# Patient Record
Sex: FEMALE | Race: WHITE | NOT HISPANIC OR LATINO | Employment: PART TIME | URBAN - METROPOLITAN AREA
[De-identification: names, ages, dates, MRNs, and addresses within clinical notes are randomized per-mention and may not be internally consistent; named-entity substitution may affect disease eponyms.]

---

## 2020-08-21 LAB — PAP RECOMMENDATION EXT: NORMAL

## 2022-11-07 LAB — BCS RECOMMENDATION EXT: NORMAL

## 2023-08-07 ENCOUNTER — TELEPHONE (OUTPATIENT)
Dept: FAMILY MEDICINE | Facility: CLINIC | Age: 68
End: 2023-08-07
Payer: MEDICARE

## 2023-08-07 NOTE — TELEPHONE ENCOUNTER
----- Message from Abhinav Berg sent at 8/7/2023  8:14 AM CDT -----  Contact: Pt  Type:  Sooner Apoointment Request    Caller is requesting a sooner appointment.  Caller declined first available appointment listed below.  Caller will not accept being placed on the waitlist and is requesting a message be sent to doctor.  Name of Caller: pt   When is the first available appointment? 09/27 -pt has an appt on that date  -   Symptoms: estab care/ pt not feeling well/ getting worse /pain at right hip/ tailbone   Would the patient rather a call back or a response via [facility]? phone  Best Call Back Number: [phone]  Additional Information:

## 2023-08-08 ENCOUNTER — OFFICE VISIT (OUTPATIENT)
Dept: FAMILY MEDICINE | Facility: CLINIC | Age: 68
End: 2023-08-08
Payer: MEDICARE

## 2023-08-08 VITALS
BODY MASS INDEX: 28.51 KG/M2 | OXYGEN SATURATION: 98 % | TEMPERATURE: 97 F | SYSTOLIC BLOOD PRESSURE: 150 MMHG | DIASTOLIC BLOOD PRESSURE: 100 MMHG | HEART RATE: 108 BPM | RESPIRATION RATE: 16 BRPM | WEIGHT: 167 LBS | HEIGHT: 64 IN

## 2023-08-08 DIAGNOSIS — M25.551 RIGHT HIP PAIN: ICD-10-CM

## 2023-08-08 DIAGNOSIS — R19.7 DIARRHEA, UNSPECIFIED TYPE: ICD-10-CM

## 2023-08-08 DIAGNOSIS — Z12.11 COLON CANCER SCREENING: ICD-10-CM

## 2023-08-08 DIAGNOSIS — I10 PRIMARY HYPERTENSION: ICD-10-CM

## 2023-08-08 DIAGNOSIS — Z76.89 ENCOUNTER TO ESTABLISH CARE: Primary | ICD-10-CM

## 2023-08-08 DIAGNOSIS — F41.9 ANXIETY: ICD-10-CM

## 2023-08-08 DIAGNOSIS — E78.5 HYPERLIPIDEMIA, UNSPECIFIED HYPERLIPIDEMIA TYPE: ICD-10-CM

## 2023-08-08 DIAGNOSIS — K21.9 GASTROESOPHAGEAL REFLUX DISEASE, UNSPECIFIED WHETHER ESOPHAGITIS PRESENT: ICD-10-CM

## 2023-08-08 DIAGNOSIS — I49.1 PAC (PREMATURE ATRIAL CONTRACTION): ICD-10-CM

## 2023-08-08 DIAGNOSIS — E03.9 HYPOTHYROIDISM, UNSPECIFIED TYPE: ICD-10-CM

## 2023-08-08 LAB
ALBUMIN SERPL BCP-MCNC: 3.8 G/DL (ref 3.4–5)
ALP SERPL-CCNC: 71 U/L (ref 45–117)
ALT SERPL W P-5'-P-CCNC: 21 U/L (ref 13–56)
ANION GAP SERPL CALC-SCNC: 6 MMOL/L (ref 3–11)
AST SERPL-CCNC: 17 U/L (ref 15–37)
BILIRUB SERPL-MCNC: 0.4 MG/DL (ref 0.2–1)
BUN SERPL-MCNC: 8 MG/DL (ref 7–18)
BUN/CREAT SERPL: 9.41 RATIO
CALCIUM SERPL-MCNC: 8.8 MG/DL (ref 8.5–10.1)
CHLORIDE SERPL-SCNC: 108 MMOL/L (ref 98–107)
CO2 SERPL-SCNC: 28 MMOL/L (ref 21–32)
CREAT SERPL-MCNC: 0.85 MG/DL (ref 0.55–1.02)
GFR ESTIMATION: > 60
GLUCOSE SERPL-MCNC: 106 MG/DL (ref 74–106)
POTASSIUM SERPL-SCNC: 4.4 MMOL/L (ref 3.5–5.1)
PROT SERPL-MCNC: 7.4 G/DL (ref 6.4–8.2)
SODIUM BLD-SCNC: 142 MMOL/L (ref 131–143)
T4 FREE SP9 P CHAL SERPL-SCNC: 1.23 NG/DL (ref 0.76–1.46)
TSH SERPL DL<=0.005 MIU/L-ACNC: 2.24 UIU/ML (ref 0.36–3.74)

## 2023-08-08 PROCEDURE — 3008F BODY MASS INDEX DOCD: CPT | Mod: CPTII,S$GLB,, | Performed by: INTERNAL MEDICINE

## 2023-08-08 PROCEDURE — 3008F PR BODY MASS INDEX (BMI) DOCUMENTED: ICD-10-PCS | Mod: CPTII,S$GLB,, | Performed by: INTERNAL MEDICINE

## 2023-08-08 PROCEDURE — 99205 OFFICE O/P NEW HI 60 MIN: CPT | Mod: S$GLB,,, | Performed by: INTERNAL MEDICINE

## 2023-08-08 PROCEDURE — 3080F DIAST BP >= 90 MM HG: CPT | Mod: CPTII,S$GLB,, | Performed by: INTERNAL MEDICINE

## 2023-08-08 PROCEDURE — 1159F PR MEDICATION LIST DOCUMENTED IN MEDICAL RECORD: ICD-10-PCS | Mod: CPTII,S$GLB,, | Performed by: INTERNAL MEDICINE

## 2023-08-08 PROCEDURE — 1160F RVW MEDS BY RX/DR IN RCRD: CPT | Mod: CPTII,S$GLB,, | Performed by: INTERNAL MEDICINE

## 2023-08-08 PROCEDURE — 3077F PR MOST RECENT SYSTOLIC BLOOD PRESSURE >= 140 MM HG: ICD-10-PCS | Mod: CPTII,S$GLB,, | Performed by: INTERNAL MEDICINE

## 2023-08-08 PROCEDURE — 3080F PR MOST RECENT DIASTOLIC BLOOD PRESSURE >= 90 MM HG: ICD-10-PCS | Mod: CPTII,S$GLB,, | Performed by: INTERNAL MEDICINE

## 2023-08-08 PROCEDURE — 3077F SYST BP >= 140 MM HG: CPT | Mod: CPTII,S$GLB,, | Performed by: INTERNAL MEDICINE

## 2023-08-08 PROCEDURE — 1159F MED LIST DOCD IN RCRD: CPT | Mod: CPTII,S$GLB,, | Performed by: INTERNAL MEDICINE

## 2023-08-08 PROCEDURE — 99205 PR OFFICE/OUTPT VISIT, NEW, LEVL V, 60-74 MIN: ICD-10-PCS | Mod: S$GLB,,, | Performed by: INTERNAL MEDICINE

## 2023-08-08 PROCEDURE — 1160F PR REVIEW ALL MEDS BY PRESCRIBER/CLIN PHARMACIST DOCUMENTED: ICD-10-PCS | Mod: CPTII,S$GLB,, | Performed by: INTERNAL MEDICINE

## 2023-08-08 RX ORDER — BUSPIRONE HYDROCHLORIDE 10 MG/1
10 TABLET ORAL 2 TIMES DAILY
Qty: 60 TABLET | Refills: 3 | Status: SHIPPED | OUTPATIENT
Start: 2023-08-08 | End: 2023-10-06

## 2023-08-08 RX ORDER — METOPROLOL SUCCINATE 25 MG/1
25 TABLET, EXTENDED RELEASE ORAL NIGHTLY
Qty: 90 TABLET | Refills: 1 | Status: SHIPPED | OUTPATIENT
Start: 2023-08-08 | End: 2023-11-08

## 2023-08-08 RX ORDER — HYDROXYZINE HYDROCHLORIDE 25 MG/1
TABLET, FILM COATED ORAL
Qty: 90 TABLET | Refills: 1 | Status: SHIPPED | OUTPATIENT
Start: 2023-08-08 | End: 2023-10-06

## 2023-08-08 RX ORDER — LEVOTHYROXINE SODIUM 88 UG/1
88 TABLET ORAL
COMMUNITY

## 2023-08-08 RX ORDER — ROSUVASTATIN CALCIUM 10 MG/1
10 TABLET, COATED ORAL
COMMUNITY
Start: 2023-04-15 | End: 2023-08-08 | Stop reason: SDUPTHER

## 2023-08-08 RX ORDER — METOPROLOL SUCCINATE 25 MG/1
25 TABLET, EXTENDED RELEASE ORAL NIGHTLY
COMMUNITY
Start: 2023-05-18 | End: 2023-08-08 | Stop reason: SDUPTHER

## 2023-08-08 RX ORDER — ROSUVASTATIN CALCIUM 10 MG/1
10 TABLET, COATED ORAL DAILY
Qty: 90 TABLET | Refills: 1 | Status: SHIPPED | OUTPATIENT
Start: 2023-08-08 | End: 2023-11-08

## 2023-08-08 RX ORDER — HYDROXYZINE HYDROCHLORIDE 25 MG/1
TABLET, FILM COATED ORAL
COMMUNITY
Start: 2023-05-19 | End: 2023-08-08 | Stop reason: SDUPTHER

## 2023-08-08 NOTE — PROGRESS NOTES
Subjective:       Patient ID: Kristin Plascencia is a 68 y.o. female.    Chief Complaint: Establish Care (Patient states that she needs a referral. Her right hip has been hurting.)    HPI    68 y.o. female here to establish care. Patient was previously seen by Dr. Phil Curran for primary care. She bring in copy of recent clinic visit with previus pcp as well as labs from May 2023.        Health Maintenance Topics with due status: Not Due       Topic Last Completion Date    Influenza Vaccine 11/19/2022       Health Maintenance Due   Topic Date Due    Hepatitis C Screening  Never done    Lipid Panel  Never done    COVID-19 Vaccine (1) Never done    TETANUS VACCINE  Never done    Mammogram  Never done    Hemoglobin A1c (Diabetic Prevention Screening)  Never done    DEXA Scan  Never done    Colorectal Cancer Screening  Never done    Shingles Vaccine (1 of 2) Never done    Pneumococcal Vaccines (Age 65+) (1 - PCV) Never done         Medical Problems:      Patient Active Problem List   Diagnosis    Hyperlipidemia    Hypothyroidism    Right hip pain    Gastroesophageal reflux disease    Diarrhea    PAC (premature atrial contraction)    Anxiety    Primary hypertension       Current Outpatient Medications on File Prior to Visit   Medication Sig Dispense Refill    levothyroxine (SYNTHROID) 88 MCG tablet Take 88 mcg by mouth before breakfast.      [DISCONTINUED] hydrOXYzine HCL (ATARAX) 25 MG tablet TAKE 1 TABLET BY MOUTH THREE TIMES DAILY AS NEEDED FOR ACUTE ANXIETY      [DISCONTINUED] metoprolol succinate (TOPROL-XL) 25 MG 24 hr tablet Take 25 mg by mouth every evening.      [DISCONTINUED] rosuvastatin (CRESTOR) 10 MG tablet Take 10 mg by mouth.       No current facility-administered medications on file prior to visit.           Past Medical History:   Diagnosis Date    Agatston coronary artery calcium score less than 100     Anxiety      Past Surgical History:   Procedure Laterality Date    GALLBLADDER SURGERY  2016     SINUS SURGERY       Family History   Problem Relation Age of Onset    Alzheimer's disease Mother     Cancer Father     Asthma Sister      Social History     Socioeconomic History    Marital status:    Tobacco Use    Smoking status: Never     Passive exposure: Never    Smokeless tobacco: Never   Substance and Sexual Activity    Alcohol use: Yes     Comment: socially    Drug use: Never     Review of patient's allergies indicates:  No Known Allergies    Current Outpatient Medications:     levothyroxine (SYNTHROID) 88 MCG tablet, Take 88 mcg by mouth before breakfast., Disp: , Rfl:     busPIRone (BUSPAR) 10 MG tablet, Take 1 tablet (10 mg total) by mouth 2 (two) times daily., Disp: 60 tablet, Rfl: 3    hydrOXYzine HCL (ATARAX) 25 MG tablet, TAKE 1 TABLET BY MOUTH THREE TIMES DAILY AS NEEDED FOR ACUTE ANXIETY, Disp: 90 tablet, Rfl: 1    metoprolol succinate (TOPROL-XL) 25 MG 24 hr tablet, Take 1 tablet (25 mg total) by mouth every evening., Disp: 90 tablet, Rfl: 1    rosuvastatin (CRESTOR) 10 MG tablet, Take 1 tablet (10 mg total) by mouth once daily., Disp: 90 tablet, Rfl: 1        Review of Systems   Constitutional:  Negative for diaphoresis and fever.   Respiratory:  Negative for cough and shortness of breath.    Cardiovascular:  Negative for chest pain and leg swelling.   Gastrointestinal:  Positive for abdominal pain (cramping/ bloating) and diarrhea. Negative for blood in stool.   Genitourinary:  Negative for difficulty urinating and dysuria.   Musculoskeletal:  Positive for arthralgias. Negative for gait problem.   Skin:  Negative for pallor.   Neurological:  Negative for syncope and weakness.   Psychiatric/Behavioral:  The patient is nervous/anxious.        Objective:        Vitals:    08/08/23 0809 08/08/23 0905   BP: (!) 181/114 (!) 150/100   BP Location: Left arm Right arm   Patient Position: Sitting Sitting   BP Method: Large (Manual) Large (Manual)   Pulse: 108    Resp: 16    Temp: 97.2 °F (36.2 °C)   "  SpO2: 98%    Weight: 75.8 kg (167 lb)    Height: 5' 4" (1.626 m)        Body mass index is 28.67 kg/m².    Physical Exam  Constitutional:       General: She is not in acute distress.     Appearance: She is well-developed. She is not diaphoretic.   HENT:      Head: Normocephalic and atraumatic.      Right Ear: External ear normal.      Left Ear: External ear normal.   Cardiovascular:      Rate and Rhythm: Normal rate and regular rhythm.   Pulmonary:      Effort: Pulmonary effort is normal.      Breath sounds: Normal breath sounds.   Abdominal:      General: There is no distension.      Palpations: Abdomen is soft.   Musculoskeletal:      Cervical back: Neck supple.      Right hip: No deformity or lacerations.      Right upper leg: No deformity.      Right lower leg: No edema.      Left lower leg: No edema.   Skin:     General: Skin is warm and dry.      Nails: There is no clubbing.   Neurological:      General: No focal deficit present.      Mental Status: She is alert.      Gait: Gait normal.   Psychiatric:         Mood and Affect: Mood is anxious.         Behavior: Behavior normal.         Thought Content: Thought content normal.         Judgment: Judgment normal.         Assessment:     1. Encounter to establish care    2. Colon cancer screening    3. Hyperlipidemia, unspecified hyperlipidemia type    4. Hypothyroidism, unspecified type    5. Right hip pain    6. Gastroesophageal reflux disease, unspecified whether esophagitis present    7. Diarrhea, unspecified type    8. PAC (premature atrial contraction)    9. Anxiety    10. Primary hypertension           Plan:         1. Encounter to establish care  - reviewed healthcare maintenance and pt's chronic medical problems.   - labs - reviewed outside labs, ordered f/u for today  - immunizations reviewed  - CRC screen - due  - pap/gyn - utd annually w/ Dr. Snowden  - mammo - utd annually w/ Dr. Snowden  - Comprehensive Metabolic Panel; Future  - Comprehensive " Metabolic Panel    2. Colon cancer screening    - Ambulatory referral/consult to Gastroenterology; Future    3. Hyperlipidemia, unspecified hyperlipidemia type    - rosuvastatin (CRESTOR) 10 MG tablet; Take 1 tablet (10 mg total) by mouth once daily.  Dispense: 90 tablet; Refill: 1  - Lipids Digital Medicine (LDMP) Enrollment Order  - Lipids Digital Medicine (LDMP): Assign Onboarding Questionnaires    4. Hypothyroidism, unspecified type    - levothyroxine (SYNTHROID) 88 MCG tablet; Take 88 mcg by mouth before breakfast.  - TSH w/reflex to FT4; Future  - TSH w/reflex to FT4    5. Right hip pain  - further management pending results. Discussed possible interventional pain ref vs ortho.   Pt politely declines PT referral d/t limited time availability w/ work schedule.   - X-Ray Hip 2 or 3 views Right (with Pelvis when performed); Future    6. Gastroesophageal reflux disease, unspecified whether esophagitis present    - Ambulatory referral/consult to Gastroenterology; Future    7. Diarrhea, unspecified type    - Ambulatory referral/consult to Gastroenterology; Future    8. PAC (premature atrial contraction)  - per medical records, pt unaware of dx  - metoprolol succinate (TOPROL-XL) 25 MG 24 hr tablet; Take 1 tablet (25 mg total) by mouth every evening.  Dispense: 90 tablet; Refill: 1    9. Anxiety  - not optimally controlled w/ prn hydroxyzine. Discussed options, elected for trial of buspar  - hydrOXYzine HCL (ATARAX) 25 MG tablet; TAKE 1 TABLET BY MOUTH THREE TIMES DAILY AS NEEDED FOR ACUTE ANXIETY  Dispense: 90 tablet; Refill: 1  - busPIRone (BUSPAR) 10 MG tablet; Take 1 tablet (10 mg total) by mouth 2 (two) times daily.  Dispense: 60 tablet; Refill: 3    10. Primary hypertension  - pt reports good bp at home. Cont current meds. Ctm home BP - RTC 2-4 weeks w/ BP log and home monitor  - metoprolol succinate (TOPROL-XL) 25 MG 24 hr tablet; Take 1 tablet (25 mg total) by mouth every evening.  Dispense: 90 tablet;  Refill: 1  - Hypertension Digital Medicine (HDMP) Enrollment Order  - Hypertension Digital Medicine (HDMP): Assign Onboarding Questionnaires                Unless there are intervening problems, Follow up in about 4 weeks (around 9/5/2023), or if symptoms worsen or fail to improve, for follow up, HTN, anxiety.      Patient note was created using MModal Dictation.  Any errors in syntax or even information may not have been identified and edited on initial review prior to signing this note.    I spent a total of 60 minutes on the day of the visit.        This includes face to face time and non-face to face time preparing to see the patient (eg, review of tests), obtaining and/or reviewing separately obtained history, documenting clinical information in the electronic or other health record, independently interpreting results and communicating results to the patient/family/caregiver, or care coordinator.

## 2023-08-08 NOTE — PROGRESS NOTES
Results released to patient portal.      Your results have been reviewed.  Electrolytes, liver and kidney function within normal range.   Thyroid hormone labs normal on current dose ov levothyroxine.   Please call if you have any questions or concerns.    Sincerely,   Dr. Moore

## 2023-08-22 ENCOUNTER — TELEPHONE (OUTPATIENT)
Dept: FAMILY MEDICINE | Facility: CLINIC | Age: 68
End: 2023-08-22
Payer: MEDICARE

## 2023-08-22 NOTE — TELEPHONE ENCOUNTER
Called pt. To check on her setting up HTN [facility].  Pt. States she does not want to do this at this time and will discuss it further at next appt with Dr. Lauren Moore

## 2023-09-05 ENCOUNTER — OFFICE VISIT (OUTPATIENT)
Dept: FAMILY MEDICINE | Facility: CLINIC | Age: 68
End: 2023-09-05
Payer: MEDICARE

## 2023-09-05 VITALS
DIASTOLIC BLOOD PRESSURE: 78 MMHG | BODY MASS INDEX: 29.84 KG/M2 | HEIGHT: 64 IN | SYSTOLIC BLOOD PRESSURE: 123 MMHG | OXYGEN SATURATION: 99 % | HEART RATE: 90 BPM | TEMPERATURE: 97 F | RESPIRATION RATE: 15 BRPM | WEIGHT: 174.81 LBS

## 2023-09-05 DIAGNOSIS — F41.9 ANXIETY: ICD-10-CM

## 2023-09-05 DIAGNOSIS — I49.1 PAC (PREMATURE ATRIAL CONTRACTION): ICD-10-CM

## 2023-09-05 DIAGNOSIS — I10 PRIMARY HYPERTENSION: Primary | ICD-10-CM

## 2023-09-05 DIAGNOSIS — E03.9 HYPOTHYROIDISM, UNSPECIFIED TYPE: ICD-10-CM

## 2023-09-05 PROCEDURE — 1126F PR PAIN SEVERITY QUANTIFIED, NO PAIN PRESENT: ICD-10-PCS | Mod: CPTII,S$GLB,, | Performed by: INTERNAL MEDICINE

## 2023-09-05 PROCEDURE — 1160F PR REVIEW ALL MEDS BY PRESCRIBER/CLIN PHARMACIST DOCUMENTED: ICD-10-PCS | Mod: CPTII,S$GLB,, | Performed by: INTERNAL MEDICINE

## 2023-09-05 PROCEDURE — 3074F PR MOST RECENT SYSTOLIC BLOOD PRESSURE < 130 MM HG: ICD-10-PCS | Mod: CPTII,S$GLB,, | Performed by: INTERNAL MEDICINE

## 2023-09-05 PROCEDURE — 3288F PR FALLS RISK ASSESSMENT DOCUMENTED: ICD-10-PCS | Mod: CPTII,S$GLB,, | Performed by: INTERNAL MEDICINE

## 2023-09-05 PROCEDURE — 99214 PR OFFICE/OUTPT VISIT, EST, LEVL IV, 30-39 MIN: ICD-10-PCS | Mod: S$GLB,,, | Performed by: INTERNAL MEDICINE

## 2023-09-05 PROCEDURE — 1126F AMNT PAIN NOTED NONE PRSNT: CPT | Mod: CPTII,S$GLB,, | Performed by: INTERNAL MEDICINE

## 2023-09-05 PROCEDURE — 3078F DIAST BP <80 MM HG: CPT | Mod: CPTII,S$GLB,, | Performed by: INTERNAL MEDICINE

## 2023-09-05 PROCEDURE — 99214 OFFICE O/P EST MOD 30 MIN: CPT | Mod: S$GLB,,, | Performed by: INTERNAL MEDICINE

## 2023-09-05 PROCEDURE — 3008F PR BODY MASS INDEX (BMI) DOCUMENTED: ICD-10-PCS | Mod: CPTII,S$GLB,, | Performed by: INTERNAL MEDICINE

## 2023-09-05 PROCEDURE — 3008F BODY MASS INDEX DOCD: CPT | Mod: CPTII,S$GLB,, | Performed by: INTERNAL MEDICINE

## 2023-09-05 PROCEDURE — 3074F SYST BP LT 130 MM HG: CPT | Mod: CPTII,S$GLB,, | Performed by: INTERNAL MEDICINE

## 2023-09-05 PROCEDURE — 1101F PT FALLS ASSESS-DOCD LE1/YR: CPT | Mod: CPTII,S$GLB,, | Performed by: INTERNAL MEDICINE

## 2023-09-05 PROCEDURE — 3288F FALL RISK ASSESSMENT DOCD: CPT | Mod: CPTII,S$GLB,, | Performed by: INTERNAL MEDICINE

## 2023-09-05 PROCEDURE — 1160F RVW MEDS BY RX/DR IN RCRD: CPT | Mod: CPTII,S$GLB,, | Performed by: INTERNAL MEDICINE

## 2023-09-05 PROCEDURE — 1159F PR MEDICATION LIST DOCUMENTED IN MEDICAL RECORD: ICD-10-PCS | Mod: CPTII,S$GLB,, | Performed by: INTERNAL MEDICINE

## 2023-09-05 PROCEDURE — 3078F PR MOST RECENT DIASTOLIC BLOOD PRESSURE < 80 MM HG: ICD-10-PCS | Mod: CPTII,S$GLB,, | Performed by: INTERNAL MEDICINE

## 2023-09-05 PROCEDURE — 1101F PR PT FALLS ASSESS DOC 0-1 FALLS W/OUT INJ PAST YR: ICD-10-PCS | Mod: CPTII,S$GLB,, | Performed by: INTERNAL MEDICINE

## 2023-09-05 PROCEDURE — 1159F MED LIST DOCD IN RCRD: CPT | Mod: CPTII,S$GLB,, | Performed by: INTERNAL MEDICINE

## 2023-09-05 NOTE — PROGRESS NOTES
"Subjective:       Patient ID: Kristin Plascencia is a 68 y.o. female.    Chief Complaint: Follow-up (Pt. Here for 4wk f/u for high BP and med. F/u.  Pt. Brought home BP log and monitor.  Pt. States Buspar caused heart racing, sweating and HA's.  Pt. D/c'd Buspar 2 days ago.)    HPI    68 y.o. female presents for follow up of chronic medical problems:     HTN: well controlled BP at home. Home monitor c/w clinic manual reading. Notes white coat HTN.     PAC: metoprolol 25mg daily    Anxiety: hydroxyzine 25mg prn. Buspar 10mg caused SE so discontinued.    HLD: rosuvastatin 10mg    Hypothyroidism: levothyroxine 88mcg    Has gyn apt scheduled 9/15/23 with Dr. Snowden for pap and mammo orders.  Pt brought copy of labs last clinic visit, however not uploaded to media section at this time. She will bring copy when has GI apt on 10/6    Review of Systems   Constitutional:  Negative for fever.   Respiratory:  Negative for shortness of breath.    Cardiovascular:  Negative for chest pain.   Genitourinary:  Negative for difficulty urinating.   Neurological:  Negative for syncope.   Psychiatric/Behavioral:  The patient is nervous/anxious.        Objective:        Vitals:    09/05/23 1031 09/05/23 1057   BP: (!) 200/100  Comment: Pt. Monitor:  194/101 123/78   BP Location: Left arm    Patient Position: Sitting    BP Method: Large (Manual)    Pulse: 90    Resp: 15    Temp: 97.3 °F (36.3 °C)    TempSrc: Temporal    SpO2: 99%    Weight: 79.3 kg (174 lb 12.8 oz)    Height: 5' 4" (1.626 m)        Body mass index is 30 kg/m².    Physical Exam  Constitutional:       General: She is not in acute distress.     Appearance: She is well-developed.   HENT:      Head: Normocephalic and atraumatic.      Right Ear: External ear normal.      Left Ear: External ear normal.   Cardiovascular:      Rate and Rhythm: Normal rate.   Pulmonary:      Effort: Pulmonary effort is normal.   Skin:     General: Skin is warm and dry.   Neurological:      General: No " focal deficit present.      Mental Status: She is alert. Mental status is at baseline.   Psychiatric:         Mood and Affect: Mood is anxious.         Assessment:     1. Primary hypertension    2. PAC (premature atrial contraction)    3. Hypothyroidism, unspecified type           Plan:         1. Primary hypertension  - well controlled at home    2. PAC (premature atrial contraction)  - cont metoprolol 25mg daily    3. Hypothyroidism, unspecified type  - cont synthroid 88mcg. Refill request prn.    4. Anxiety  - intolerant to hydroxyzine, buspar. Previously intolerant to SSRI, SNRI- trial many in past. Pt wishes for short acting prn med as generally acute panic attacks however is reluctant for benzodiazepine which I agree with. Discussed CBT w/ therapist which she will look into as she is about to have lifestyle change since her daughter and three grandsons are moving in with her later this week.      Bring copy of recent labs at pt's convenience.   Pap and mammo through gyn- upcoming apt.  Has GI apt scheduled to discuss c-scope.       Unless there are intervening problems, Follow up in about 6 months (around 3/5/2024), or if symptoms worsen or fail to improve, for annual, follow up.      Patient note was created using MModal Dictation.  Any errors in syntax or even information may not have been identified and edited on initial review prior to signing this note.    I spent a total of 34 minutes on the day of the visit.  This includes face to face time and non-face to face time preparing to see the patient (eg, review of tests), obtaining and/or reviewing separately obtained history, documenting clinical information in the electronic or other health record, independently interpreting results and communicating results to the patient/family/caregiver, or care coordinator.

## 2023-09-08 ENCOUNTER — PATIENT OUTREACH (OUTPATIENT)
Dept: ADMINISTRATIVE | Facility: HOSPITAL | Age: 68
End: 2023-09-08
Payer: MEDICARE

## 2023-09-08 NOTE — LETTER
AUTHORIZATION FOR RELEASE OF   CONFIDENTIAL INFORMATION        We are seeing Kristin Plascencia, date of birth 1955, in the clinic at [facility]. Lauren Moore MD is the patient's PCP. Kristin Plascencia has an outstanding lab/procedure at the time we reviewed her chart. In order to help keep her health information updated, she has authorized us to request the following medical record(s):        (  )  MAMMOGRAM                                      (  )  COLONOSCOPY      (  )  PAP SMEAR                                          (X)  LAB RESULTS      (X)  DEXA SCAN                                          (  )  EYE EXAM            (  )  FOOT EXAM                                          (  )  ENTIRE RECORD     (  )  OUTSIDE IMMUNIZATIONS                 (  )  _______________    **SIGNED ANNA ATTACHED    Please fax records to [facility], Paulk, Emily M, MD, [phone]    [facility]  Care Coordination Department  [facility]    Patient Name: Kristin Plascencia  : 1955  Patient Phone #: [phone]

## 2023-09-08 NOTE — LETTER
AUTHORIZATION FOR RELEASE OF   CONFIDENTIAL INFORMATION        We are seeing Kristin Plascencia, date of birth 1955, in the clinic at [facility]. Lauren Moore MD is the patient's PCP. Kristin Plascencia has an outstanding lab/procedure at the time we reviewed her chart. In order to help keep her health information updated, she has authorized us to request the following medical record(s):        (  )  MAMMOGRAM                                      (  )  COLONOSCOPY      (  )  PAP SMEAR                                          (  )  LAB RESULTS      (  )  DEXA SCAN                                          (X)  DM EYE EXAM            (  )  FOOT EXAM                                          (  )  ENTIRE RECORD     (  )  OUTSIDE IMMUNIZATIONS                 (  )  _______________    **SIGNED ANNA ATTACHED    Please fax records to [facility], Paulk, Emily M, MD, [phone]    [facility]  Care Coordination Department  [facility]    Patient Name: Kristin Plascencia  : 1955  Patient Phone #: [phone]

## 2023-09-08 NOTE — LETTER
AUTHORIZATION FOR RELEASE OF   CONFIDENTIAL INFORMATION        We are seeing Kristin Plascencia, date of birth 1955, in the clinic at [facility]. Lauren Moore MD is the patient's PCP. Kristin Plascencia has an outstanding lab/procedure at the time we reviewed her chart. In order to help keep her health information updated, she has authorized us to request the following medical record(s):        (X)  MAMMOGRAM                                      (  )  COLONOSCOPY      (X)  PAP SMEAR                                          (  )  LAB RESULTS      (X)  DEXA SCAN                                          (  )  EYE EXAM            (  )  FOOT EXAM                                          (  )  ENTIRE RECORD     (  )  OUTSIDE IMMUNIZATIONS                 (  )  _______________    **SIGNED ANNA ATTACHED    Please fax records to [facility], Paulk, Emily M, MD, [phone]    [facility]  Care Coordination Department  [facility]    Patient Name: Kristin Plascencia  : 1955  Patient Phone #: [phone]

## 2023-09-08 NOTE — PROGRESS NOTES
Health Maintenance ANNA 09-:  ANNA faxed to Dr. Curran for Hemoglobin A1c, Lipid Panel, MicoAlbumin Urine  ANNA faxed to [facility] for DM EYE EXAM  ANNA faxed to Dr. Snowden for Pap Smear, Mammogram-Scheduled for 09/15/2023    Scheduled for Colonoscopy screening with gastro department 10/06/2023  2022 Flu Vaccine satisfied to

## 2023-09-12 NOTE — PROGRESS NOTES
Uploaded 2023 EYE EXAM to media.    Per Dr. Curran office, No labs collect or record of labs for patient.  Hemoglobin A1c, Lipid Panel, Microalbumin Urine, or Dexa Scan    Per office visit with PCP on 09/05, patient brought copy last lab to visit but records were not scan into media at that time.

## 2023-10-06 ENCOUNTER — TELEPHONE (OUTPATIENT)
Dept: FAMILY MEDICINE | Facility: CLINIC | Age: 68
End: 2023-10-06
Payer: MEDICARE

## 2023-10-06 ENCOUNTER — OFFICE VISIT (OUTPATIENT)
Dept: GASTROENTEROLOGY | Facility: CLINIC | Age: 68
End: 2023-10-06
Payer: MEDICARE

## 2023-10-06 VITALS
HEART RATE: 90 BPM | OXYGEN SATURATION: 98 % | DIASTOLIC BLOOD PRESSURE: 90 MMHG | HEIGHT: 64 IN | BODY MASS INDEX: 29.19 KG/M2 | WEIGHT: 171 LBS | SYSTOLIC BLOOD PRESSURE: 175 MMHG

## 2023-10-06 DIAGNOSIS — R13.10 DYSPHAGIA, UNSPECIFIED TYPE: ICD-10-CM

## 2023-10-06 DIAGNOSIS — Z12.11 COLON CANCER SCREENING: ICD-10-CM

## 2023-10-06 DIAGNOSIS — E03.9 HYPOTHYROIDISM, UNSPECIFIED TYPE: ICD-10-CM

## 2023-10-06 DIAGNOSIS — R19.7 DIARRHEA, UNSPECIFIED TYPE: Primary | ICD-10-CM

## 2023-10-06 DIAGNOSIS — K21.9 GASTROESOPHAGEAL REFLUX DISEASE, UNSPECIFIED WHETHER ESOPHAGITIS PRESENT: ICD-10-CM

## 2023-10-06 PROCEDURE — 3077F SYST BP >= 140 MM HG: CPT | Mod: CPTII,S$GLB,,

## 2023-10-06 PROCEDURE — 3008F PR BODY MASS INDEX (BMI) DOCUMENTED: ICD-10-PCS | Mod: CPTII,S$GLB,,

## 2023-10-06 PROCEDURE — 3080F PR MOST RECENT DIASTOLIC BLOOD PRESSURE >= 90 MM HG: ICD-10-PCS | Mod: CPTII,S$GLB,,

## 2023-10-06 PROCEDURE — 1160F RVW MEDS BY RX/DR IN RCRD: CPT | Mod: CPTII,S$GLB,,

## 2023-10-06 PROCEDURE — 3080F DIAST BP >= 90 MM HG: CPT | Mod: CPTII,S$GLB,,

## 2023-10-06 PROCEDURE — 3077F PR MOST RECENT SYSTOLIC BLOOD PRESSURE >= 140 MM HG: ICD-10-PCS | Mod: CPTII,S$GLB,,

## 2023-10-06 PROCEDURE — 1160F PR REVIEW ALL MEDS BY PRESCRIBER/CLIN PHARMACIST DOCUMENTED: ICD-10-PCS | Mod: CPTII,S$GLB,,

## 2023-10-06 PROCEDURE — 1159F PR MEDICATION LIST DOCUMENTED IN MEDICAL RECORD: ICD-10-PCS | Mod: CPTII,S$GLB,,

## 2023-10-06 PROCEDURE — 1159F MED LIST DOCD IN RCRD: CPT | Mod: CPTII,S$GLB,,

## 2023-10-06 PROCEDURE — 4010F PR ACE/ARB THEARPY RXD/TAKEN: ICD-10-PCS | Mod: CPTII,S$GLB,,

## 2023-10-06 PROCEDURE — 3008F BODY MASS INDEX DOCD: CPT | Mod: CPTII,S$GLB,,

## 2023-10-06 PROCEDURE — 4010F ACE/ARB THERAPY RXD/TAKEN: CPT | Mod: CPTII,S$GLB,,

## 2023-10-06 PROCEDURE — 99204 PR OFFICE/OUTPT VISIT, NEW, LEVL IV, 45-59 MIN: ICD-10-PCS | Mod: S$GLB,,,

## 2023-10-06 PROCEDURE — 99204 OFFICE O/P NEW MOD 45 MIN: CPT | Mod: S$GLB,,,

## 2023-10-06 RX ORDER — SOD SULF/POT CHLORIDE/MAG SULF 1.479 G
TABLET ORAL
Qty: 24 TABLET | Refills: 0 | Status: SHIPPED | OUTPATIENT
Start: 2023-10-06 | End: 2024-03-12

## 2023-10-06 RX ORDER — MONTELUKAST SODIUM 4 MG/1
1 TABLET, CHEWABLE ORAL 2 TIMES DAILY
Qty: 60 TABLET | Refills: 3 | Status: SHIPPED | OUTPATIENT
Start: 2023-10-06 | End: 2024-03-12

## 2023-10-06 NOTE — TELEPHONE ENCOUNTER
Fax RF:  Synthroid [facility] mail order to     [facility]  Fax:  [phone]    *I do not see pharmacy in EMR*

## 2023-10-06 NOTE — PROGRESS NOTES
Clinic Note    Reason for visit:  The primary encounter diagnosis was Diarrhea, unspecified type. Diagnoses of Gastroesophageal reflux disease, unspecified whether esophagitis present, Dysphagia, unspecified type, and Colon cancer screening were also pertinent to this visit.    PCP: Lauren Moore [address] [place] [id number] / [place] [address]    HPI:  This is a 68 y.o. female who is here to establish care. Patient is overdue for repeat colonoscopy. She had colonoscopy at age 51. No polyps per patient. She has IBS-D and has had more diarrhea since having GB out years ago. No blood in stool. She has bouts of reflux and will have hoarseness and cough during that time. Occasionally has dysphagia with solids and liquids. Does not require medication for reflux. No prior EGD.       Review of Systems   Constitutional:  Negative for fatigue, fever and unexpected weight change.   HENT:  Positive for postnasal drip. Negative for mouth sores, sore throat and trouble swallowing.    Eyes:  Positive for eye dryness. Negative for pain and discharge.   Respiratory:  Negative for apnea, cough, choking, chest tightness, shortness of breath and wheezing.    Cardiovascular:  Negative for chest pain, palpitations and leg swelling.   Gastrointestinal:  Positive for diarrhea and reflux. Negative for abdominal distention, abdominal pain, anal bleeding, blood in stool, change in bowel habit, constipation, nausea, rectal pain, vomiting and fecal incontinence.   Genitourinary:  Negative for bladder incontinence, dysuria and hematuria.   Musculoskeletal:  Negative for arthralgias, back pain and joint swelling.   Integumentary:  Negative for color change and rash.   Allergic/Immunologic: Negative for environmental allergies and food allergies.   Neurological:  Negative for seizures and headaches.   Hematological:  Negative for adenopathy. Does not bruise/bleed easily.        Past Medical History:   Diagnosis Date    [facility]  "artery calcium score less than 100     Anxiety     BMI 29.0-29.9,adult     Disorder of thyroid, unspecified     Open angle primary glaucoma     s/p laser tx     Past Surgical History:   Procedure Laterality Date    GALLBLADDER SURGERY  2016    SINUS SURGERY       Family History   Problem Relation Age of Onset    Alzheimer's disease Mother     Cancer Father     Asthma Sister      Social History     Tobacco Use    Smoking status: Never     Passive exposure: Never    Smokeless tobacco: Never   Substance Use Topics    Alcohol use: Yes     Comment: socially    Drug use: Never     Review of patient's allergies indicates:  No Known Allergies   Medication List with Changes/Refills   New Medications    COLESTIPOL (COLESTID) 1 GRAM TAB    Take 1 tablet (1 g total) by mouth 2 (two) times daily. Take 2 hours before or 4 hours after other medications.    SOD SULF-POT CHLORIDE-MAG SULF (SUTAB) 1.479-0.188- 0.225 GRAM TABLET    Take according to package instructions with indicated amount of water. No breakfast day before test. May substitute with Suprep, Clenpiq, Plenvu, Moviprep or GoLytely based on Rx plan and patient preference.   Current Medications    LEVOTHYROXINE (SYNTHROID) 88 MCG TABLET    Take 88 mcg by mouth before breakfast.    METOPROLOL SUCCINATE (TOPROL-XL) 25 MG 24 HR TABLET    Take 1 tablet (25 mg total) by mouth every evening.    ROSUVASTATIN (CRESTOR) 10 MG TABLET    Take 1 tablet (10 mg total) by mouth once daily.   Discontinued Medications    BUSPIRONE (BUSPAR) 10 MG TABLET    Take 1 tablet (10 mg total) by mouth 2 (two) times daily.    HYDROXYZINE HCL (ATARAX) 25 MG TABLET    TAKE 1 TABLET BY MOUTH THREE TIMES DAILY AS NEEDED FOR ACUTE ANXIETY         Vital Signs:  BP (!) 175/90   Pulse 90   Ht 5' 4" (1.626 m)   Wt 77.6 kg (171 lb)   LMP  (LMP Unknown)   SpO2 98%   BMI 29.35 kg/m²        Physical Exam  Vitals reviewed.   Constitutional:       General: She is awake. She is not in acute distress.     " Appearance: Normal appearance. She is well-developed. She is not ill-appearing, toxic-appearing or diaphoretic.   HENT:      Head: Normocephalic and atraumatic.      Nose: Nose normal.      Mouth/Throat:      Mouth: Mucous membranes are moist.      Pharynx: Oropharynx is clear. No oropharyngeal exudate or posterior oropharyngeal erythema.   Eyes:      General: Lids are normal. Gaze aligned appropriately. No scleral icterus.        Right eye: No discharge.         Left eye: No discharge.      Conjunctiva/sclera: Conjunctivae normal.   Neck:      Trachea: Trachea normal.   Cardiovascular:      Rate and Rhythm: Normal rate and regular rhythm.      Pulses:           Radial pulses are 2+ on the right side and 2+ on the left side.   Pulmonary:      Effort: Pulmonary effort is normal. No respiratory distress.      Breath sounds: No stridor. No wheezing.   Chest:      Chest wall: No tenderness.   Abdominal:      General: Bowel sounds are normal. There is no distension.      Palpations: Abdomen is soft. There is no fluid wave, hepatomegaly or mass.      Tenderness: There is no abdominal tenderness. There is no guarding or rebound.   Musculoskeletal:         General: No tenderness or deformity.      Cervical back: Full passive range of motion without pain and neck supple. No tenderness.      Right lower leg: No edema.      Left lower leg: No edema.   Lymphadenopathy:      Cervical: No cervical adenopathy.   Skin:     General: Skin is warm and dry.      Capillary Refill: Capillary refill takes less than 2 seconds.      Coloration: Skin is not cyanotic, jaundiced or pale.   Neurological:      General: No focal deficit present.      Mental Status: She is alert and oriented to person, place, and time.      Motor: No tremor.   Psychiatric:         Attention and Perception: Attention normal.         Mood and Affect: Mood and affect normal.         Speech: Speech normal.         Behavior: Behavior normal. Behavior is cooperative.             All of the data above and below has been reviewed by myself and any further interpretations will be reflected in the assessment and plan.   The data includes review of external notes, and independent interpretation of lab results, procedures, x-rays, and imaging reports.      Assessment:  Diarrhea, unspecified type  -     Ambulatory referral/consult to Gastroenterology    Gastroesophageal reflux disease, unspecified whether esophagitis present  -     Ambulatory referral/consult to Gastroenterology  -     Ambulatory Referral to External Surgery    Dysphagia, unspecified type  -     Ambulatory Referral to External Surgery    Colon cancer screening  -     Ambulatory referral/consult to Gastroenterology  -     Ambulatory Referral to External Surgery  -     sod sulf-pot chloride-mag sulf (SUTAB) 1.479-0.188- 0.225 gram tablet; Take according to package instructions with indicated amount of water. No breakfast day before test. May substitute with Suprep, Clenpiq, Plenvu, Moviprep or GoLytely based on Rx plan and patient preference.  Dispense: 24 tablet; Refill: 0    Other orders  -     colestipoL (COLESTID) 1 gram Tab; Take 1 tablet (1 g total) by mouth 2 (two) times daily. Take 2 hours before or 4 hours after other medications.  Dispense: 60 tablet; Refill: 3    Likely BAD, trial of bile acid binder. Consider Xifaxan.   Due for colon  Plan for EBx. Consider EM if EGD unrevealing to rule out dysmotility.     Recommendations:  Schedule upper and lower endoscopies with Dr. Morales.   Begin taking colestipol 1 tablet twice a day.     Risks, benefits, and alternatives of medical management, any associated procedures, and/or treatment discussed with the patient. Patient given opportunity to ask questions and voices understanding. Patient has elected to proceed with the recommended care modalities as discussed.        Order summary:  Orders Placed This Encounter   Procedures    Ambulatory Referral to External Surgery         Instructed patient to notify my office if they have not been contacted within two weeks after any procedures, submitting any samples (biopsies, blood, stool, urine, etc.) or after any imaging (X-ray, CT, MRI, etc.).      Ayleen Diamond NP    This document may have been created using a voice recognition transcribing system. Incorrect words or phrases may have been missed during proofreading. Please interpret accordingly or contact me for clarification.

## 2023-10-06 NOTE — PATIENT INSTRUCTIONS
Schedule upper and lower endoscopies with Dr. Morales.   Begin taking colestipol 1 tablet twice a day.

## 2023-10-10 RX ORDER — LEVOTHYROXINE SODIUM 88 UG/1
88 TABLET ORAL
Qty: 90 TABLET | Refills: 1 | Status: SHIPPED | OUTPATIENT
Start: 2023-10-10 | End: 2024-03-12

## 2023-10-10 NOTE — TELEPHONE ENCOUNTER
"Pt. Advised and verbalized understanding.  Pt. States she has not been checking bp at home due to "being busy", but she will start checking it again and call us within the week with the readings.  "

## 2023-10-10 NOTE — TELEPHONE ENCOUNTER
Unable to find pharmacy/fax on to e-rx. Rx printed and signed.   Can we get updated BP from pt? Last on file is significantly elevated. If BP running high at home, please schedule clinic apt.

## 2023-10-24 ENCOUNTER — TELEPHONE (OUTPATIENT)
Dept: FAMILY MEDICINE | Facility: CLINIC | Age: 68
End: 2023-10-24
Payer: MEDICARE

## 2023-10-24 VITALS — SYSTOLIC BLOOD PRESSURE: 123 MMHG | DIASTOLIC BLOOD PRESSURE: 75 MMHG

## 2023-10-24 NOTE — TELEPHONE ENCOUNTER
Reviewed home BP log. BP well controlled. Continue current management, no changes.   Remote BP updated w/ most recent BP reading

## 2023-10-27 ENCOUNTER — TELEPHONE (OUTPATIENT)
Dept: FAMILY MEDICINE | Facility: CLINIC | Age: 68
End: 2023-10-27
Payer: MEDICARE

## 2023-10-27 VITALS — SYSTOLIC BLOOD PRESSURE: 129 MMHG | DIASTOLIC BLOOD PRESSURE: 86 MMHG

## 2023-10-27 NOTE — TELEPHONE ENCOUNTER
Pt. Has a log of BP readings that she brought to us beginning on 10/11/2023 with 129/86 6am    10/12/23:  146/80 6am  10/12/2023: 131/73  second reading (no time given)    10/13/23: 118/77 8:30am    10/14/23  147/83  9am    10/15/23:  112/56  10:40am    10/16/23  113/68  10am    10/17/23  133/75  6am  10/17/23  131/66  9:30pm    10/18/23  108/72  6am  10/18/23  123/75  9:30pm    Pt. Reports no concerns at this time.  No sx reported.

## 2023-11-08 DIAGNOSIS — I49.1 PAC (PREMATURE ATRIAL CONTRACTION): ICD-10-CM

## 2023-11-08 DIAGNOSIS — I10 PRIMARY HYPERTENSION: ICD-10-CM

## 2023-11-08 DIAGNOSIS — E78.5 HYPERLIPIDEMIA, UNSPECIFIED HYPERLIPIDEMIA TYPE: ICD-10-CM

## 2023-11-08 RX ORDER — METOPROLOL SUCCINATE 25 MG/1
25 TABLET, EXTENDED RELEASE ORAL DAILY
Qty: 90 TABLET | Refills: 1 | Status: SHIPPED | OUTPATIENT
Start: 2023-11-08 | End: 2024-03-12 | Stop reason: SDUPTHER

## 2023-11-08 RX ORDER — ROSUVASTATIN CALCIUM 10 MG/1
10 TABLET, COATED ORAL DAILY
Qty: 90 TABLET | Refills: 1 | Status: SHIPPED | OUTPATIENT
Start: 2023-11-08 | End: 2024-03-12 | Stop reason: SDUPTHER

## 2023-11-10 LAB — BCS RECOMMENDATION EXT: NORMAL

## 2023-11-29 ENCOUNTER — TELEPHONE (OUTPATIENT)
Dept: FAMILY MEDICINE | Facility: CLINIC | Age: 68
End: 2023-11-29
Payer: MEDICARE

## 2023-11-29 NOTE — TELEPHONE ENCOUNTER
Was put  On Amoxicillin originally the first time she went to Urgent Care, On cefdinir now.  Tessalon capsules are not helping cough  Pt. States cough is unbearable, off and on  X 6 weeks.  Pt. Has been to Urgent Care twice.  No fever.  Pt. Being tx for strep.    Neg. Covid, Neg Flu on 11/30.    Please advise.

## 2023-11-29 NOTE — TELEPHONE ENCOUNTER
----- Message from Evangelina Antunez sent at 11/29/2023 12:08 PM CST -----  States she would like to schedule an Urgent Care f/u for sinus, strep and coughing. Nothing coming up on the schedule. Please call pt [phone]. Thank you

## 2023-12-07 ENCOUNTER — PATIENT OUTREACH (OUTPATIENT)
Dept: ADMINISTRATIVE | Facility: HOSPITAL | Age: 68
End: 2023-12-07
Payer: MEDICARE

## 2023-12-07 NOTE — PROGRESS NOTES
Working [employer] HTN Report.    Chart reviewed. Phone note 10/27/23 included list of bp readings.   The last being 10/18/23  123/75  9:30pm   LM requesting an updated BP reading.

## 2023-12-18 ENCOUNTER — PATIENT OUTREACH (OUTPATIENT)
Dept: ADMINISTRATIVE | Facility: HOSPITAL | Age: 68
End: 2023-12-18
Payer: MEDICARE

## 2024-01-31 DIAGNOSIS — Z78.0 MENOPAUSE: ICD-10-CM

## 2024-02-09 ENCOUNTER — TELEPHONE (OUTPATIENT)
Dept: GASTROENTEROLOGY | Facility: CLINIC | Age: 69
End: 2024-02-09
Payer: MEDICARE

## 2024-02-09 VITALS — BODY MASS INDEX: 29.19 KG/M2 | HEIGHT: 64 IN | WEIGHT: 171 LBS

## 2024-02-09 DIAGNOSIS — R13.10 DYSPHAGIA, UNSPECIFIED TYPE: ICD-10-CM

## 2024-02-09 DIAGNOSIS — Z12.11 COLON CANCER SCREENING: ICD-10-CM

## 2024-02-09 DIAGNOSIS — K21.9 GASTROESOPHAGEAL REFLUX DISEASE, UNSPECIFIED WHETHER ESOPHAGITIS PRESENT: Primary | ICD-10-CM

## 2024-02-09 NOTE — TELEPHONE ENCOUNTER
"[facility]  [address] [place] [address]  Phone: [phone]  Fax: [phone]    History & Physical         Provider: Dr. Rima Morales    Patient Name: Kristin HATHAWAY (age):1955  69 y.o.           Gender: female   Phone: [phone]     Referring Physician: Lauren Moore     Vital Signs:   Height - 5' 4"  Weight - 171 lb  BMI -  29.35    Plan: EGD/Colonoscopy @ [facility]    Encounter Diagnoses   Name Primary?    Gastroesophageal reflux disease, unspecified whether esophagitis present Yes    Dysphagia, unspecified type     Colon cancer screening            History:      Past Medical History:   Diagnosis Date    [place] coronary artery calcium score less than 100     Anxiety     BMI 29.0-29.9,adult     Disorder of thyroid, unspecified     Open angle primary glaucoma     s/p laser tx      Past Surgical History:   Procedure Laterality Date    GALLBLADDER SURGERY      SINUS SURGERY        Medication List with Changes/Refills   Current Medications    COLESTIPOL (COLESTID) 1 GRAM TAB    Take 1 tablet (1 g total) by mouth 2 (two) times daily. Take 2 hours before or 4 hours after other medications.    LEVOTHYROXINE (SYNTHROID) 88 MCG TABLET    Take 88 mcg by mouth before breakfast.    METOPROLOL SUCCINATE (TOPROL-XL) 25 MG 24 HR TABLET    Take 1 tablet (25 mg total) by mouth once daily.    ROSUVASTATIN (CRESTOR) 10 MG TABLET    Take 1 tablet (10 mg total) by mouth once daily.    SOD SULF-POT CHLORIDE-MAG SULF (SUTAB) 1.479-0.188- 0.225 GRAM TABLET    Take according to package instructions with indicated amount of water. No breakfast day before test. May substitute with Suprep, Clenpiq, Plenvu, Moviprep or GoLytely based on Rx plan and patient preference.    SYNTHROID 88 MCG TABLET    Take 1 tablet (88 mcg total) by mouth before breakfast.      Review of patient's allergies indicates:  No Known " Allergies   Family History   Problem Relation Age of Onset    Alzheimer's disease Mother     Cancer Father     Asthma Sister       Social History     Tobacco Use    Smoking status: Never     Passive exposure: Never    Smokeless tobacco: Never   Substance Use Topics    Alcohol use: Yes     Comment: socially    Drug use: Never        Physical Examination:     General Appearance:___________________________  HEENT: _____________________________________  Abdomen:____________________________________  Heart:________________________________________  Lungs:_______________________________________  Extremities:___________________________________  Skin:_________________________________________  Endocrine:____________________________________  Genitourinary:_________________________________  Neurological:__________________________________      Patient has been evaluated immediately prior to sedation and is medically cleared for endoscopy with IVCS as an ASA class: ______      Physician Signature: _________________________       Date: ________  Time: ________

## 2024-02-09 NOTE — TELEPHONE ENCOUNTER
Called and left a detailed message that I was calling as a courtesy regarding her upcoming EGD/Colon with NBP on 2/19/24, Monday and wanted to verify that she had her paper prep instructions and meds. I also mentioned that [facility] will call on Friday 16th with the arrival time, GI Lab is located on the third floor, and to pre-register next week. ranjit

## 2024-02-19 ENCOUNTER — OUTSIDE PLACE OF SERVICE (OUTPATIENT)
Dept: GASTROENTEROLOGY | Facility: CLINIC | Age: 69
End: 2024-02-19

## 2024-02-19 LAB — CRC RECOMMENDATION EXT: NORMAL

## 2024-02-19 PROCEDURE — 45380 COLONOSCOPY AND BIOPSY: CPT | Mod: PT,59,, | Performed by: INTERNAL MEDICINE

## 2024-02-19 PROCEDURE — 45385 COLONOSCOPY W/LESION REMOVAL: CPT | Mod: PT,,, | Performed by: INTERNAL MEDICINE

## 2024-02-19 PROCEDURE — 43239 EGD BIOPSY SINGLE/MULTIPLE: CPT | Mod: 51,,, | Performed by: INTERNAL MEDICINE

## 2024-02-26 ENCOUNTER — TELEPHONE (OUTPATIENT)
Dept: GASTROENTEROLOGY | Facility: CLINIC | Age: 69
End: 2024-02-26
Payer: MEDICARE

## 2024-02-26 DIAGNOSIS — R13.10 DYSPHAGIA, UNSPECIFIED TYPE: Primary | ICD-10-CM

## 2024-02-27 NOTE — TELEPHONE ENCOUNTER
DBx nl, GBx wnl w/o Hp, EBx nl, CBx nl, 2 hyp, repeat colonoscopy in 5 years.     Notify patient. No signs of infection, precancerous cells or Celiac disease on the upper endoscopy biopsies.  Her colon polyps were benign. No colitis on CBx. Repeat colonoscopy in 5 years. Confirm recall tab in appointment desk is up-to-date (update if needed). Rec EM given swallowing Sx and unrevealing EGD.  Rec Xifaxan course of colestipol did not help loose stools.  NBP

## 2024-02-29 NOTE — TELEPHONE ENCOUNTER
Called and spoke with patient gave her results and she understood , also her 5 year is updated in the recall tab.  kdc

## 2024-03-05 ENCOUNTER — DOCUMENTATION ONLY (OUTPATIENT)
Dept: GASTROENTEROLOGY | Facility: CLINIC | Age: 69
End: 2024-03-05
Payer: MEDICARE

## 2024-03-06 DIAGNOSIS — Z78.0 MENOPAUSE: ICD-10-CM

## 2024-03-12 ENCOUNTER — TELEPHONE (OUTPATIENT)
Dept: FAMILY MEDICINE | Facility: CLINIC | Age: 69
End: 2024-03-12

## 2024-03-12 ENCOUNTER — OFFICE VISIT (OUTPATIENT)
Dept: FAMILY MEDICINE | Facility: CLINIC | Age: 69
End: 2024-03-12
Payer: MEDICARE

## 2024-03-12 VITALS
HEART RATE: 64 BPM | BODY MASS INDEX: 28.65 KG/M2 | WEIGHT: 167.81 LBS | TEMPERATURE: 97 F | HEIGHT: 64 IN | RESPIRATION RATE: 15 BRPM | SYSTOLIC BLOOD PRESSURE: 122 MMHG | DIASTOLIC BLOOD PRESSURE: 74 MMHG | OXYGEN SATURATION: 99 %

## 2024-03-12 DIAGNOSIS — E78.5 HYPERLIPIDEMIA, UNSPECIFIED HYPERLIPIDEMIA TYPE: ICD-10-CM

## 2024-03-12 DIAGNOSIS — I49.1 PAC (PREMATURE ATRIAL CONTRACTION): ICD-10-CM

## 2024-03-12 DIAGNOSIS — Z79.899 OTHER LONG TERM (CURRENT) DRUG THERAPY: ICD-10-CM

## 2024-03-12 DIAGNOSIS — Z00.00 ANNUAL PHYSICAL EXAM: Primary | ICD-10-CM

## 2024-03-12 DIAGNOSIS — E03.9 HYPOTHYROIDISM, UNSPECIFIED TYPE: ICD-10-CM

## 2024-03-12 DIAGNOSIS — I10 PRIMARY HYPERTENSION: ICD-10-CM

## 2024-03-12 PROCEDURE — 3078F DIAST BP <80 MM HG: CPT | Mod: CPTII,S$GLB,, | Performed by: INTERNAL MEDICINE

## 2024-03-12 PROCEDURE — 1160F RVW MEDS BY RX/DR IN RCRD: CPT | Mod: CPTII,S$GLB,, | Performed by: INTERNAL MEDICINE

## 2024-03-12 PROCEDURE — 99215 OFFICE O/P EST HI 40 MIN: CPT | Mod: S$GLB,,, | Performed by: INTERNAL MEDICINE

## 2024-03-12 PROCEDURE — 3008F BODY MASS INDEX DOCD: CPT | Mod: CPTII,S$GLB,, | Performed by: INTERNAL MEDICINE

## 2024-03-12 PROCEDURE — 1126F AMNT PAIN NOTED NONE PRSNT: CPT | Mod: CPTII,S$GLB,, | Performed by: INTERNAL MEDICINE

## 2024-03-12 PROCEDURE — 1159F MED LIST DOCD IN RCRD: CPT | Mod: CPTII,S$GLB,, | Performed by: INTERNAL MEDICINE

## 2024-03-12 PROCEDURE — 3288F FALL RISK ASSESSMENT DOCD: CPT | Mod: CPTII,S$GLB,, | Performed by: INTERNAL MEDICINE

## 2024-03-12 PROCEDURE — 1101F PT FALLS ASSESS-DOCD LE1/YR: CPT | Mod: CPTII,S$GLB,, | Performed by: INTERNAL MEDICINE

## 2024-03-12 PROCEDURE — 3074F SYST BP LT 130 MM HG: CPT | Mod: CPTII,S$GLB,, | Performed by: INTERNAL MEDICINE

## 2024-03-12 RX ORDER — LEVOTHYROXINE SODIUM 88 UG/1
88 TABLET ORAL
Qty: 30 TABLET | Refills: 11 | Status: SHIPPED | OUTPATIENT
Start: 2024-03-12 | End: 2024-05-29

## 2024-03-12 RX ORDER — METOPROLOL SUCCINATE 25 MG/1
25 TABLET, EXTENDED RELEASE ORAL DAILY
Qty: 90 TABLET | Refills: 1 | Status: SHIPPED | OUTPATIENT
Start: 2024-03-12

## 2024-03-12 RX ORDER — ROSUVASTATIN CALCIUM 10 MG/1
10 TABLET, COATED ORAL DAILY
Qty: 90 TABLET | Refills: 1 | Status: SHIPPED | OUTPATIENT
Start: 2024-03-12

## 2024-03-12 NOTE — TELEPHONE ENCOUNTER
----- Message from Ara Kovacs sent at 3/12/2024 11:37 AM CDT -----  Contact: pt  Pt calling about receipt about her b/p is not correct and would like a call back and she can be reached at [phone]     Thanks,

## 2024-03-12 NOTE — PROGRESS NOTES
Subjective:       Patient ID: Kristin Plascencia is a 69 y.o. female.    Chief Complaint: Annual Exam (Pt. Here for 6mth f/u for Annual.  Pt. Brought BP log.  States BP is always high in Dr's office.  No c/o today.)    HPI      69 y.o. female here for healthcare maintenance and f/u chronic medical conditions.      Health Maintenance Topics with due status: Not Due       Topic Last Completion Date    Mammogram 11/10/2023    Colorectal Cancer Screening 02/19/2024       Health Maintenance Due   Topic Date Due    Hepatitis C Screening  Never done    Lipid Panel  Never done    TETANUS VACCINE  Never done    Hemoglobin A1c (Diabetic Prevention Screening)  Never done    DEXA Scan  Never done    Shingles Vaccine (1 of 2) Never done    RSV Vaccine (Age 60+ and Pregnant patients) (1 - 1-dose 60+ series) Never done    Pneumococcal Vaccines (Age 65+) (1 of 1 - PCV) Never done    Influenza Vaccine (1) 09/01/2023    COVID-19 Vaccine (3 - 2023-24 season) 09/01/2023       Health Maintenance Due   Topic Date Due    Hepatitis C Screening  Never done    Lipid Panel  Never done    TETANUS VACCINE  Never done    Hemoglobin A1c (Diabetic Prevention Screening)  Never done    DEXA Scan  Never done    Shingles Vaccine (1 of 2) Never done    RSV Vaccine (Age 60+ and Pregnant patients) (1 - 1-dose 60+ series) Never done    Pneumococcal Vaccines (Age 65+) (1 of 1 - PCV) Never done    Influenza Vaccine (1) 09/01/2023    COVID-19 Vaccine (3 - 2023-24 season) 09/01/2023           Medical Problems:      HTN: well controlled BP at home. Home monitor c/w clinic manual reading. Notes white coat HTN.      PAC: metoprolol 25mg daily     Anxiety: hydroxyzine 25mg prn and Buspar 10mg caused SE so discontinued. Prefers not taking medications.     HLD: rosuvastatin 10mg. Stopped for approx 2 months d/t hip pain, she is agreeable to resuming statin.     Hypothyroidism: synthroid 88mcg      Past Medical History:   [facility]  [facility]     Anxiety     BMI 29.0-29.9,adult     Disorder of thyroid, unspecified     Open angle primary glaucoma     s/p laser tx     Past Surgical History:   Procedure Laterality Date    GALLBLADDER SURGERY  2016    SINUS SURGERY       Family History   Problem Relation Age of Onset    Alzheimer's disease Mother     Cancer Father     Asthma Sister      Social History     Socioeconomic History    Marital status:    Tobacco Use    Smoking status: Never     Passive exposure: Never    Smokeless tobacco: Never   Substance and Sexual Activity    Alcohol use: Yes     Comment: socially    Drug use: Never    Sexual activity: Not Currently     Partners: Male     Birth control/protection: Post-menopausal     Review of patient's allergies indicates:  No Known Allergies    Current Outpatient Medications:     levothyroxine (SYNTHROID) 88 MCG tablet, Take 88 mcg by mouth before breakfast., Disp: , Rfl:     metoprolol succinate (TOPROL-XL) 25 MG 24 hr tablet, Take 1 tablet (25 mg total) by mouth once daily., Disp: 90 tablet, Rfl: 1    rosuvastatin (CRESTOR) 10 MG tablet, Take 1 tablet (10 mg total) by mouth once daily., Disp: 90 tablet, Rfl: 1    SYNTHROID 88 mcg tablet, Take 1 tablet (88 mcg total) by mouth before breakfast., Disp: 30 tablet, Rfl: 11        Review of Systems   Constitutional:  Negative for diaphoresis and fever.   HENT:  Negative for trouble swallowing.    Respiratory:  Negative for cough and shortness of breath.    Cardiovascular:  Negative for chest pain and leg swelling.   Gastrointestinal:  Negative for abdominal pain and blood in stool.   Genitourinary:  Negative for difficulty urinating.   Musculoskeletal:  Positive for arthralgias.   Skin:  Negative for pallor.   Neurological:  Negative for syncope and weakness.          Objective:        Vitals:    03/12/24 0826 03/12/24 0842   BP: (!) 190/90 122/74   BP Location: Left arm    Patient Position: Sitting    BP Method: Large  "(Manual)    Pulse: 64    Resp: 15    Temp: 97.2 °F (36.2 °C)    TempSrc: Temporal    SpO2: 99%    Weight: 76.1 kg (167 lb 12.8 oz)    Height: 5' 4" (1.626 m)        Body mass index is 28.8 kg/m².    Physical Exam  Constitutional:       General: She is not in acute distress.     Appearance: She is well-developed. She is not diaphoretic.   HENT:      Head: Normocephalic and atraumatic.      Right Ear: External ear normal.      Left Ear: External ear normal.   Eyes:      Conjunctiva/sclera: Conjunctivae normal.   Cardiovascular:      Rate and Rhythm: Normal rate and regular rhythm.      Heart sounds: Normal heart sounds.   Pulmonary:      Effort: Pulmonary effort is normal.      Breath sounds: Normal breath sounds.   Abdominal:      General: There is no distension.      Palpations: Abdomen is soft.   Musculoskeletal:      Cervical back: Neck supple.      Right lower leg: No edema.      Left lower leg: No edema.   Skin:     General: Skin is warm and dry.      Nails: There is no clubbing.   Neurological:      General: No focal deficit present.      Mental Status: She is alert.   Psychiatric:         Behavior: Behavior normal.         Judgment: Judgment normal.         Assessment:     1. Annual physical exam    2. Hypothyroidism, unspecified type    3. Primary hypertension    4. Hyperlipidemia, unspecified hyperlipidemia type    5. Other long term (current) drug therapy    6. PAC (premature atrial contraction)           Plan:         1. Annual physical exam  - labs ordered fasting  - immunizations reviewed, discussed  - mammo 11/23  - CRC screen 2/2024  - CBC Auto Differential; Future  - Comprehensive Metabolic Panel; Future  - Hemoglobin A1C; Future  - Lipid Panel; Future  - TSH; Future  - T4, Free; Future    2. Hypothyroidism, unspecified type    - CBC Auto Differential; Future  - TSH; Future  - T4, Free; Future  - SYNTHROID 88 mcg tablet; Take 1 tablet (88 mcg total) by mouth before breakfast.  Dispense: 30 tablet; " Refill: 11    3. Primary hypertension    - CBC Auto Differential; Future  - Comprehensive Metabolic Panel; Future  - metoprolol succinate (TOPROL-XL) 25 MG 24 hr tablet; Take 1 tablet (25 mg total) by mouth once daily.  Dispense: 90 tablet; Refill: 1    4. Hyperlipidemia, unspecified hyperlipidemia type    - CBC Auto Differential; Future  - Lipid Panel; Future  - rosuvastatin (CRESTOR) 10 MG tablet; Take 1 tablet (10 mg total) by mouth once daily.  Dispense: 90 tablet; Refill: 1    5. Other long term (current) drug therapy    - CBC Auto Differential; Future  - Hemoglobin A1C; Future    6. PAC (premature atrial contraction)    - CBC Auto Differential; Future  - metoprolol succinate (TOPROL-XL) 25 MG 24 hr tablet; Take 1 tablet (25 mg total) by mouth once daily.  Dispense: 90 tablet; Refill: 1              Unless there are intervening problems, Follow up in about 6 months (around 9/12/2024) for follow up.      Patient note was created using MModal Dictation.  Any errors in syntax or even information may not have been identified and edited on initial review prior to signing this note.    I spent a total of 40 minutes on the day of the visit.  This includes face to face time and non-face to face time preparing to see the patient (eg, review of tests), obtaining and/or reviewing separately obtained history, documenting clinical information in the electronic or other health record, independently interpreting results and communicating results to the patient/family/caregiver, or care coordinator.

## 2024-05-23 ENCOUNTER — TELEPHONE (OUTPATIENT)
Dept: FAMILY MEDICINE | Facility: CLINIC | Age: 69
End: 2024-05-23
Payer: MEDICARE

## 2024-05-23 NOTE — TELEPHONE ENCOUNTER
----- Message from Elaina Castañeda sent at 5/23/2024  1:44 PM CDT -----  Contact: Patient  Patient called to consult with nurse or staff regarding an email she received regarding her prescription SYNTHROID 88 mcg tablet. She states the pharmacy is needing a renewal for this prescription and she left a fax number of [phone]. She would like to speak with clinic regarding this and can be reached at  [phone]. Thanks/Mr

## 2024-05-29 DIAGNOSIS — E03.9 HYPOTHYROIDISM, UNSPECIFIED TYPE: ICD-10-CM

## 2024-05-29 RX ORDER — LEVOTHYROXINE SODIUM 88 UG/1
88 TABLET ORAL
Qty: 90 TABLET | Refills: 0 | Status: SHIPPED | OUTPATIENT
Start: 2024-05-29

## 2024-05-29 NOTE — TELEPHONE ENCOUNTER
----- Message from Neda Juarez sent at 5/29/2024 11:15 AM CDT -----  Contact: self  Patient is requesting a call back regarding needing prescription faxed over - wants to speak with Mary Ann. Please call back at [phone]

## 2024-07-03 DIAGNOSIS — E03.9 HYPOTHYROIDISM, UNSPECIFIED TYPE: ICD-10-CM

## 2024-07-03 RX ORDER — LEVOTHYROXINE SODIUM 88 UG/1
88 TABLET ORAL
Qty: 90 TABLET | Refills: 0 | Status: SHIPPED | OUTPATIENT
Start: 2024-07-03

## 2024-07-03 NOTE — TELEPHONE ENCOUNTER
----- Message from Alla Merlos LPN sent at 7/3/2024 10:39 AM CDT -----    ----- Message -----  From: Emily Tejada  Sent: 7/3/2024  10:25 AM CDT  To: [facility] Staff    Name of Who is Calling:pt           What is the request in detail:patient requesting a call asap as she has made attempts to get refill for synthroid but it has not been filled yet.      [facility]          Can the clinic reply by [facility]:no           What Number to Call Back if not in [facility]: [phone]

## 2024-07-09 DIAGNOSIS — E03.9 HYPOTHYROIDISM, UNSPECIFIED TYPE: ICD-10-CM

## 2024-07-09 RX ORDER — LEVOTHYROXINE SODIUM 88 UG/1
88 TABLET ORAL
Qty: 90 TABLET | Refills: 0 | Status: SHIPPED | OUTPATIENT
Start: 2024-07-09

## 2024-07-10 ENCOUNTER — TELEPHONE (OUTPATIENT)
Dept: FAMILY MEDICINE | Facility: CLINIC | Age: 69
End: 2024-07-10
Payer: MEDICARE

## 2024-08-01 ENCOUNTER — TELEPHONE (OUTPATIENT)
Dept: FAMILY MEDICINE | Facility: CLINIC | Age: 69
End: 2024-08-01
Payer: MEDICARE

## 2024-08-03 DIAGNOSIS — E78.5 HYPERLIPIDEMIA, UNSPECIFIED HYPERLIPIDEMIA TYPE: ICD-10-CM

## 2024-08-03 DIAGNOSIS — I49.1 PAC (PREMATURE ATRIAL CONTRACTION): ICD-10-CM

## 2024-08-03 DIAGNOSIS — I10 PRIMARY HYPERTENSION: ICD-10-CM

## 2024-08-06 RX ORDER — ROSUVASTATIN CALCIUM 10 MG/1
10 TABLET, COATED ORAL
Qty: 90 TABLET | Refills: 0 | Status: SHIPPED | OUTPATIENT
Start: 2024-08-06

## 2024-08-06 RX ORDER — METOPROLOL SUCCINATE 25 MG/1
25 TABLET, EXTENDED RELEASE ORAL
Qty: 90 TABLET | Refills: 0 | Status: SHIPPED | OUTPATIENT
Start: 2024-08-06

## 2024-09-13 ENCOUNTER — PATIENT MESSAGE (OUTPATIENT)
Dept: PRIMARY CARE CLINIC | Facility: CLINIC | Age: 69
End: 2024-09-13
Payer: MEDICARE

## 2024-09-19 ENCOUNTER — OFFICE VISIT (OUTPATIENT)
Dept: FAMILY MEDICINE | Facility: CLINIC | Age: 69
End: 2024-09-19
Payer: MEDICARE

## 2024-09-19 VITALS
WEIGHT: 172.19 LBS | SYSTOLIC BLOOD PRESSURE: 123 MMHG | RESPIRATION RATE: 15 BRPM | HEIGHT: 64 IN | OXYGEN SATURATION: 99 % | BODY MASS INDEX: 29.4 KG/M2 | HEART RATE: 94 BPM | DIASTOLIC BLOOD PRESSURE: 73 MMHG | TEMPERATURE: 98 F

## 2024-09-19 DIAGNOSIS — Z78.0 POSTMENOPAUSAL: ICD-10-CM

## 2024-09-19 DIAGNOSIS — Z12.31 VISIT FOR SCREENING MAMMOGRAM: ICD-10-CM

## 2024-09-19 DIAGNOSIS — I10 PRIMARY HYPERTENSION: ICD-10-CM

## 2024-09-19 DIAGNOSIS — E78.5 HYPERLIPIDEMIA, UNSPECIFIED HYPERLIPIDEMIA TYPE: ICD-10-CM

## 2024-09-19 DIAGNOSIS — I49.1 PAC (PREMATURE ATRIAL CONTRACTION): ICD-10-CM

## 2024-09-19 DIAGNOSIS — E03.9 HYPOTHYROIDISM, UNSPECIFIED TYPE: Primary | ICD-10-CM

## 2024-09-19 PROCEDURE — 99214 OFFICE O/P EST MOD 30 MIN: CPT | Mod: S$GLB,,, | Performed by: INTERNAL MEDICINE

## 2024-09-19 PROCEDURE — 3044F HG A1C LEVEL LT 7.0%: CPT | Mod: CPTII,S$GLB,, | Performed by: INTERNAL MEDICINE

## 2024-09-19 PROCEDURE — 3288F FALL RISK ASSESSMENT DOCD: CPT | Mod: CPTII,S$GLB,, | Performed by: INTERNAL MEDICINE

## 2024-09-19 PROCEDURE — 1160F RVW MEDS BY RX/DR IN RCRD: CPT | Mod: CPTII,S$GLB,, | Performed by: INTERNAL MEDICINE

## 2024-09-19 PROCEDURE — 3074F SYST BP LT 130 MM HG: CPT | Mod: CPTII,S$GLB,, | Performed by: INTERNAL MEDICINE

## 2024-09-19 PROCEDURE — 3008F BODY MASS INDEX DOCD: CPT | Mod: CPTII,S$GLB,, | Performed by: INTERNAL MEDICINE

## 2024-09-19 PROCEDURE — 1126F AMNT PAIN NOTED NONE PRSNT: CPT | Mod: CPTII,S$GLB,, | Performed by: INTERNAL MEDICINE

## 2024-09-19 PROCEDURE — 3078F DIAST BP <80 MM HG: CPT | Mod: CPTII,S$GLB,, | Performed by: INTERNAL MEDICINE

## 2024-09-19 PROCEDURE — 1101F PT FALLS ASSESS-DOCD LE1/YR: CPT | Mod: CPTII,S$GLB,, | Performed by: INTERNAL MEDICINE

## 2024-09-19 PROCEDURE — 1159F MED LIST DOCD IN RCRD: CPT | Mod: CPTII,S$GLB,, | Performed by: INTERNAL MEDICINE

## 2024-09-19 RX ORDER — LEVOTHYROXINE SODIUM 88 UG/1
88 TABLET ORAL
Qty: 90 TABLET | Refills: 3 | Status: SHIPPED | OUTPATIENT
Start: 2024-09-19

## 2024-09-19 RX ORDER — METOPROLOL SUCCINATE 25 MG/1
25 TABLET, EXTENDED RELEASE ORAL DAILY
Qty: 90 TABLET | Refills: 1 | Status: SHIPPED | OUTPATIENT
Start: 2024-09-19

## 2024-09-19 RX ORDER — ROSUVASTATIN CALCIUM 10 MG/1
10 TABLET, COATED ORAL DAILY
Qty: 90 TABLET | Refills: 1 | Status: SHIPPED | OUTPATIENT
Start: 2024-09-19

## 2024-11-11 LAB — BCS RECOMMENDATION EXT: NORMAL

## 2024-11-12 PROBLEM — M85.851 OSTEOPENIA OF NECKS OF BOTH FEMURS: Status: ACTIVE | Noted: 2024-11-12

## 2024-11-12 PROBLEM — M85.852 OSTEOPENIA OF NECKS OF BOTH FEMURS: Status: ACTIVE | Noted: 2024-11-12

## 2024-11-18 LAB
ALBUMIN SERPL BCP-MCNC: 3.7 G/DL (ref 3.4–5)
ALBUMIN/GLOBULIN RATIO: 1.1 RATIO (ref 1.1–1.8)
ALP SERPL-CCNC: 72 U/L (ref 45–117)
ALT SERPL W P-5'-P-CCNC: 20 U/L (ref 13–56)
ANION GAP SERPL CALC-SCNC: 5 MMOL/L (ref 3–11)
AST SERPL-CCNC: 22 U/L (ref 15–37)
BILIRUB SERPL-MCNC: 0.8 MG/DL (ref 0.2–1)
BUN SERPL-MCNC: 14 MG/DL (ref 7–18)
BUN/CREAT SERPL: 15.55 RATIO
CALCIUM SERPL-MCNC: 8.4 MG/DL (ref 8.5–10.1)
CHLORIDE SERPL-SCNC: 108 MMOL/L (ref 98–107)
CO2 SERPL-SCNC: 27 MMOL/L (ref 21–32)
CREAT SERPL-MCNC: 0.9 MG/DL (ref 0.55–1.02)
GFR ESTIMATION: 70
GLOBULIN: 3.3 G/DL (ref 2.3–3.5)
GLUCOSE SERPL-MCNC: 129 MG/DL (ref 74–106)
POTASSIUM SERPL-SCNC: 3.9 MMOL/L (ref 3.5–5.1)
PROT SERPL-MCNC: 7 G/DL (ref 6.4–8.2)
SODIUM BLD-SCNC: 140 MMOL/L (ref 131–143)
T4 FREE SP9 P CHAL SERPL-SCNC: 1.24 NG/DL (ref 0.76–1.46)
TSH SERPL DL<=0.005 MIU/L-ACNC: 2.48 UIU/ML (ref 0.36–3.74)

## 2025-03-08 DIAGNOSIS — I49.1 PAC (PREMATURE ATRIAL CONTRACTION): ICD-10-CM

## 2025-03-08 DIAGNOSIS — E78.5 HYPERLIPIDEMIA, UNSPECIFIED HYPERLIPIDEMIA TYPE: ICD-10-CM

## 2025-03-08 DIAGNOSIS — I10 PRIMARY HYPERTENSION: ICD-10-CM

## 2025-03-10 RX ORDER — METOPROLOL SUCCINATE 25 MG/1
25 TABLET, EXTENDED RELEASE ORAL
Qty: 90 TABLET | Refills: 0 | Status: SHIPPED | OUTPATIENT
Start: 2025-03-10

## 2025-03-10 RX ORDER — ROSUVASTATIN CALCIUM 10 MG/1
10 TABLET, COATED ORAL
Qty: 90 TABLET | Refills: 0 | Status: SHIPPED | OUTPATIENT
Start: 2025-03-10

## 2025-03-25 ENCOUNTER — OFFICE VISIT (OUTPATIENT)
Facility: CLINIC | Age: 70
End: 2025-03-25
Payer: MEDICARE

## 2025-03-25 ENCOUNTER — TELEPHONE (OUTPATIENT)
Facility: CLINIC | Age: 70
End: 2025-03-25

## 2025-03-25 ENCOUNTER — LAB VISIT (OUTPATIENT)
Facility: CLINIC | Age: 70
End: 2025-03-25
Payer: MEDICARE

## 2025-03-25 VITALS
SYSTOLIC BLOOD PRESSURE: 125 MMHG | HEART RATE: 74 BPM | TEMPERATURE: 98 F | DIASTOLIC BLOOD PRESSURE: 80 MMHG | OXYGEN SATURATION: 99 % | HEIGHT: 64 IN | BODY MASS INDEX: 30.05 KG/M2 | WEIGHT: 176 LBS | RESPIRATION RATE: 14 BRPM

## 2025-03-25 DIAGNOSIS — Z00.00 ANNUAL PHYSICAL EXAM: Primary | ICD-10-CM

## 2025-03-25 DIAGNOSIS — M85.852 OSTEOPENIA OF NECKS OF BOTH FEMURS: ICD-10-CM

## 2025-03-25 DIAGNOSIS — E78.5 HYPERLIPIDEMIA, UNSPECIFIED HYPERLIPIDEMIA TYPE: ICD-10-CM

## 2025-03-25 DIAGNOSIS — I10 PRIMARY HYPERTENSION: ICD-10-CM

## 2025-03-25 DIAGNOSIS — Z86.39 HISTORY OF ELEVATED GLUCOSE: ICD-10-CM

## 2025-03-25 DIAGNOSIS — E03.9 HYPOTHYROIDISM, UNSPECIFIED TYPE: ICD-10-CM

## 2025-03-25 DIAGNOSIS — M85.851 OSTEOPENIA OF NECKS OF BOTH FEMURS: ICD-10-CM

## 2025-03-25 DIAGNOSIS — Z79.899 OTHER LONG TERM (CURRENT) DRUG THERAPY: ICD-10-CM

## 2025-03-25 DIAGNOSIS — Z00.00 HEALTHCARE MAINTENANCE: ICD-10-CM

## 2025-03-25 DIAGNOSIS — I49.1 PAC (PREMATURE ATRIAL CONTRACTION): ICD-10-CM

## 2025-03-25 DIAGNOSIS — Z01.89 ROUTINE LAB DRAW: Primary | ICD-10-CM

## 2025-03-25 DIAGNOSIS — Z78.9 STATIN INTOLERANCE: ICD-10-CM

## 2025-03-25 PROCEDURE — 99215 OFFICE O/P EST HI 40 MIN: CPT | Mod: S$GLB,,, | Performed by: INTERNAL MEDICINE

## 2025-03-25 PROCEDURE — 1159F MED LIST DOCD IN RCRD: CPT | Mod: CPTII,S$GLB,, | Performed by: INTERNAL MEDICINE

## 2025-03-25 PROCEDURE — 1126F AMNT PAIN NOTED NONE PRSNT: CPT | Mod: CPTII,S$GLB,, | Performed by: INTERNAL MEDICINE

## 2025-03-25 PROCEDURE — 3074F SYST BP LT 130 MM HG: CPT | Mod: CPTII,S$GLB,, | Performed by: INTERNAL MEDICINE

## 2025-03-25 PROCEDURE — 1101F PT FALLS ASSESS-DOCD LE1/YR: CPT | Mod: CPTII,S$GLB,, | Performed by: INTERNAL MEDICINE

## 2025-03-25 PROCEDURE — 3288F FALL RISK ASSESSMENT DOCD: CPT | Mod: CPTII,S$GLB,, | Performed by: INTERNAL MEDICINE

## 2025-03-25 PROCEDURE — 3079F DIAST BP 80-89 MM HG: CPT | Mod: CPTII,S$GLB,, | Performed by: INTERNAL MEDICINE

## 2025-03-25 PROCEDURE — 3008F BODY MASS INDEX DOCD: CPT | Mod: CPTII,S$GLB,, | Performed by: INTERNAL MEDICINE

## 2025-03-25 PROCEDURE — 1160F RVW MEDS BY RX/DR IN RCRD: CPT | Mod: CPTII,S$GLB,, | Performed by: INTERNAL MEDICINE

## 2025-03-25 RX ORDER — CHOLECALCIFEROL (VITAMIN D3) 25 MCG
1000 TABLET ORAL DAILY
COMMUNITY

## 2025-03-25 RX ORDER — BUTALB/ACETAMINOPHEN/CAFFEINE 50-325-40
1 TABLET ORAL 2 TIMES DAILY
COMMUNITY

## 2025-03-25 RX ORDER — METOPROLOL SUCCINATE 25 MG/1
25 TABLET, EXTENDED RELEASE ORAL DAILY
Qty: 90 TABLET | Refills: 1 | Status: SHIPPED | OUTPATIENT
Start: 2025-03-25 | End: 2025-03-25 | Stop reason: SDUPTHER

## 2025-03-25 RX ORDER — METOPROLOL SUCCINATE 25 MG/1
25 TABLET, EXTENDED RELEASE ORAL DAILY
Qty: 90 TABLET | Refills: 1 | Status: SHIPPED | OUTPATIENT
Start: 2025-03-25

## 2025-03-25 NOTE — TELEPHONE ENCOUNTER
Patient states that she still uses [facility] pharmacy and would like her other pharmacy taken off. I told her that I will do that. She VU

## 2025-03-25 NOTE — TELEPHONE ENCOUNTER
----- Message from Brenna sent at 3/25/2025 10:59 AM CDT -----  Contact: self  Type:  Needs Medical AdviceWho Called: Kristin Llanes the patient rather a call back or a response via [facility]? Call Melvin Call Back Number: [phone] Information: pt requesting a call back regarding some questions she has after her appt today

## 2025-03-25 NOTE — PROGRESS NOTES
Subjective:       Patient ID: Kristin Plascencia is a 70 y.o. female.    Chief Complaint: Annual Exam    HPI      70 y.o. female here for healthcare maintenance and f/u chronic medical conditions.        Health Maintenance Topics with due status: Not Due       Topic Last Completion Date    Colorectal Cancer Screening 02/19/2024    Hemoglobin A1c (Diabetic Prevention Screening) 03/15/2024    Lipid Panel 03/15/2024    Mammogram 11/11/2024    DEXA Scan 11/11/2024       Health Maintenance Due   Topic Date Due    Hepatitis C Screening  Never done    TETANUS VACCINE  Never done    Shingles Vaccine (1 of 2) Never done    Pneumococcal Vaccines (Age 50+) (1 of 1 - PCV) Never done    RSV Vaccine (Age 60+ and Pregnant patients) (1 - Risk 60-74 years 1-dose series) Never done    Influenza Vaccine (1) 09/01/2024    COVID-19 Vaccine (3 - 2024-25 season) 09/01/2024       Health Maintenance Due   Topic Date Due    Hepatitis C Screening  Never done    TETANUS VACCINE  Never done    Shingles Vaccine (1 of 2) Never done    Pneumococcal Vaccines (Age 50+) (1 of 1 - PCV) Never done    RSV Vaccine (Age 60+ and Pregnant patients) (1 - Risk 60-74 years 1-dose series) Never done    Influenza Vaccine (1) 09/01/2024    COVID-19 Vaccine (3 - 2024-25 season) 09/01/2024           Medical Problems:    HTN: well controlled BP at home, she brought in home BP log 100s-120s/60s-70s. Home monitor c/w clinic manual reading. Notes white coat HTN.      PAC: metoprolol 25mg daily     Anxiety: hydroxyzine 25mg prn and Buspar 10mg caused SE so discontinued. Prefers not taking medications.     HLD: rosuvastatin 10mg- stopped taking December 2024 when she was on med for shingles. Noticed significant improvement in arthralgia/myalgia so remained off statin and does not wish to resume.     Hypothyroidism: levothyroxine 88mcg      Past Medical History:   Diagnosis Date    Agatston coronary artery calcium score less than 100     Anxiety     BMI 29.0-29.9,adult      "Disorder of thyroid, unspecified     Open angle primary glaucoma     s/p laser tx     Past Surgical History:   Procedure Laterality Date    GALLBLADDER SURGERY  2016    SINUS SURGERY       Family History   Problem Relation Name Age of Onset    Alzheimer's disease Mother      Cancer Father      Asthma Sister       Social History[1]  Review of patient's allergies indicates:  No Known Allergies  Current Medications[2]        Review of Systems   Constitutional:  Negative for diaphoresis and fever.   HENT:  Negative for trouble swallowing.    Respiratory:  Negative for cough and shortness of breath.    Cardiovascular:  Negative for chest pain and leg swelling.   Gastrointestinal:  Negative for abdominal pain and blood in stool.   Genitourinary:  Negative for decreased urine volume.   Musculoskeletal:  Negative for arthralgias (improved w/ stopping statin) and gait problem.   Skin:  Negative for pallor.   Neurological:  Negative for syncope and weakness.          Objective:        Vitals:    03/25/25 0830 03/25/25 0855   BP: (!) 162/99 125/80   BP Location: Left arm    Patient Position: Sitting    Pulse: 74    Resp: 14    Temp: 97.7 °F (36.5 °C)    SpO2: 99%    Weight: 79.8 kg (176 lb)    Height: 5' 4" (1.626 m)        Body mass index is 30.21 kg/m².    Physical Exam  Constitutional:       General: She is not in acute distress.     Appearance: She is well-developed. She is not diaphoretic.   HENT:      Head: Normocephalic and atraumatic.      Right Ear: External ear normal.      Left Ear: External ear normal.   Eyes:      Conjunctiva/sclera: Conjunctivae normal.   Cardiovascular:      Rate and Rhythm: Normal rate and regular rhythm.   Pulmonary:      Effort: Pulmonary effort is normal.      Breath sounds: Normal breath sounds.   Abdominal:      General: There is no distension.      Palpations: Abdomen is soft.   Musculoskeletal:      Right lower leg: No edema.      Left lower leg: No edema.   Skin:     General: Skin is " warm and dry.      Nails: There is no clubbing.   Neurological:      General: No focal deficit present.      Mental Status: She is alert. Mental status is at baseline.   Psychiatric:         Behavior: Behavior normal.         Judgment: Judgment normal.         Assessment:     1. Annual physical exam    2. Healthcare maintenance    3. Hypothyroidism, unspecified type    4. Primary hypertension    5. Hyperlipidemia, unspecified hyperlipidemia type    6. History of elevated glucose    7. Osteopenia of necks of both femurs    8. Other long term (current) drug therapy    9. PAC (premature atrial contraction)    10. Statin intolerance           Plan:         1. Annual physical exam  - labs - fasting today  - immunizations reviewed, discussed  - mammo utd  - CRC screen utd  - dexa utd  - CBC Auto Differential  - Comprehensive Metabolic Panel  - Hemoglobin A1C  - Lipid Panel  - TSH  - T4, Free  - Vitamin D    2. Healthcare maintenance  - labs   - immunizations reviewed, discussed  - mammo utd  - CRC screen utd  - dexa utd  - CBC Auto Differential  - Comprehensive Metabolic Panel  - Hemoglobin A1C  - Lipid Panel  - TSH  - T4, Free  - Vitamin D    3. Hypothyroidism, unspecified type  - levo 88mcg. Update labs.  - CBC Auto Differential  - TSH  - T4, Free    4. Primary hypertension  - well controlled. Notable white coat htn in office, home BP log reviewed.   - CBC Auto Differential  - Comprehensive Metabolic Panel  - metoprolol succinate (TOPROL-XL) 25 MG 24 hr tablet; Take 1 tablet (25 mg total) by mouth once daily.  Dispense: 90 tablet; Refill: 1    5. Hyperlipidemia, unspecified hyperlipidemia type  - intolerant of statin- rosuva 10mg. Arthralgia/myalgia significantly improved with stopping. Update lipid panel off med, management pending results.   - CBC Auto Differential  - Lipid Panel    6. History of elevated glucose    - CBC Auto Differential  - Hemoglobin A1C    7. Osteopenia of necks of both femurs  - cont otc vit D.  Discussed dietary calcium d/t side effect constipation w/ supplment.  - CBC Auto Differential  - Vitamin D  - vitamin D (VITAMIN D3) 1000 units Tab; Take 1,000 Units by mouth once daily.  - calcium citrate-vitamin D3 315-200 mg (CITRACAL+D) 315 mg-5 mcg (200 unit) per tablet; Take 1 tablet by mouth 2 (two) times daily.    8. Other long term (current) drug therapy    - Vitamin D    9. PAC (premature atrial contraction)    - metoprolol succinate (TOPROL-XL) 25 MG 24 hr tablet; Take 1 tablet (25 mg total) by mouth once daily.  Dispense: 90 tablet; Refill: 1    10. Statin intolerance  tolerant of statin- rosuva 10mg. Arthralgia/myalgia significantly improved with stopping. Update lipid panel off med, management pending results.             Unless there are intervening problems, Follow up in about 6 months (around 9/25/2025), or if symptoms worsen or fail to improve, for follow up.      Patient note was created using MModal Dictation.  Any errors in syntax or even information may not have been identified and edited on initial review prior to signing this note.      I spent a total of 40 minutes on the day of the visit.  This includes face to face time and non-face to face time preparing to see the patient (eg, review of tests), obtaining and/or reviewing separately obtained history, documenting clinical information in the electronic or other health record, independently interpreting results and communicating results to the patient/family/caregiver, or care coordinator.         [1]   Social History  Socioeconomic History    Marital status:    Tobacco Use    Smoking status: Never     Passive exposure: Never    Smokeless tobacco: Never   Substance and Sexual Activity    Alcohol use: Yes     Comment: socially    Drug use: Never    Sexual activity: Not Currently     Partners: Male     Birth control/protection: Post-menopausal   [2]   Current Outpatient Medications:     levothyroxine (SYNTHROID) 88 MCG tablet, Take 1  tablet (88 mcg total) by mouth before breakfast., Disp: 90 tablet, Rfl: 3    vitamin D (VITAMIN D3) 1000 units Tab, Take 1,000 Units by mouth once daily., Disp: , Rfl:     calcium citrate-vitamin D3 315-200 mg (CITRACAL+D) 315 mg-5 mcg (200 unit) per tablet, Take 1 tablet by mouth 2 (two) times daily., Disp: , Rfl:     metoprolol succinate (TOPROL-XL) 25 MG 24 hr tablet, Take 1 tablet (25 mg total) by mouth once daily., Disp: 90 tablet, Rfl: 1    rosuvastatin (CRESTOR) 10 MG tablet, TAKE 1 TABLET ONE TIME DAILY (Patient not taking: Reported on 3/25/2025), Disp: 90 tablet, Rfl: 0

## 2025-04-03 ENCOUNTER — TELEPHONE (OUTPATIENT)
Facility: CLINIC | Age: 70
End: 2025-04-03
Payer: MEDICARE

## 2025-04-03 DIAGNOSIS — Z78.9 STATIN INTOLERANCE: Primary | ICD-10-CM

## 2025-04-03 DIAGNOSIS — E78.5 HYPERLIPIDEMIA, UNSPECIFIED HYPERLIPIDEMIA TYPE: ICD-10-CM

## 2025-04-03 RX ORDER — EVOLOCUMAB 140 MG/ML
140 INJECTION, SOLUTION SUBCUTANEOUS
Qty: 2 ML | Refills: 3 | Status: SHIPPED | OUTPATIENT
Start: 2025-04-03 | End: 2025-04-08

## 2025-04-03 NOTE — TELEPHONE ENCOUNTER
Please inform patient of test results:   (Labs did not interface)  Cholesterol is elevated since stopping the rosuvastatin. We can try repatha if her insurance covers it- rx sent to local [facility]. Recommend 3 month f/u after starting med to monitor.   Everything else looks good... thyroid, blood count, diabetes screen, kidney function and vitamin D level.

## 2025-04-04 NOTE — TELEPHONE ENCOUNTER
Patient states that she does not want to be on a statin or the repatha at this time and she wants to keep her 6 month appointment.

## 2025-08-07 DIAGNOSIS — I49.1 PAC (PREMATURE ATRIAL CONTRACTION): ICD-10-CM

## 2025-08-07 DIAGNOSIS — I10 PRIMARY HYPERTENSION: ICD-10-CM

## 2025-08-08 RX ORDER — METOPROLOL SUCCINATE 25 MG/1
25 TABLET, EXTENDED RELEASE ORAL
Qty: 100 TABLET | Refills: 0 | Status: SHIPPED | OUTPATIENT
Start: 2025-08-08

## 2025-08-19 DIAGNOSIS — E03.9 HYPOTHYROIDISM, UNSPECIFIED TYPE: ICD-10-CM

## 2025-08-19 RX ORDER — LEVOTHYROXINE SODIUM 88 UG/1
88 TABLET ORAL
Qty: 90 TABLET | Refills: 3 | Status: SHIPPED | OUTPATIENT
Start: 2025-08-19